# Patient Record
Sex: FEMALE | Race: WHITE
[De-identification: names, ages, dates, MRNs, and addresses within clinical notes are randomized per-mention and may not be internally consistent; named-entity substitution may affect disease eponyms.]

---

## 2020-03-11 NOTE — PCM.PREANE
Preanesthetic Assessment





- Procedure


Proposed Procedure: 





Left Total Hip Arthroplasty





- Anesthesia/Transfusion/Family Hx


Anesthesia History: Prior Anesthesia Without Reaction


Family History of Anesthesia Reaction: No


Transfusion History: No Prior Transfusion(s)





- Review of Systems


General: No Symptoms


Pulmonary: No Symptoms, Other (Asthma symptoms when around cigarette smoke and 

perfumes. )


Cardiovascular: No Symptoms (CABG in 2018, follows with cardiology, has been 

doing well since. )


Gastrointestinal: Other (Occasional heart burn if eating before bed. )


Neurological: Pre-Existing Deficit (Chronic low back pain, gets epidural 

steroid injections when needed. Pain goes to hip and knees. Her back is hurting 

while she is resting in bed this morning, pain is tolerable. ), Other (

Fibromyalgia)


Other: Reports: None (Caring for her  with cancer. )





- Physical Assessment


NPO Status Date: 03/10/20


NPO Status Time: 22:00


Vital Signs: 





 Last Vital Signs











Temp  36.4 C   03/11/20 07:27


 


Pulse  89   03/11/20 07:27


 


Resp  16   03/11/20 07:27


 


BP  146/78 H  03/11/20 08:15


 


Pulse Ox  98   03/11/20 07:27











Height: 1.57 m


Weight: 78.471 kg


ASA Class: 3


Mental Status: Alert & Oriented x3


Airway Class: Mallampati = 2


Dentition: Reports: Normal Dentition


Thyro-Mental Finger Breadths: 3


Mouth Opening Finger Breadths: 3


ROM/Head Extension: Full


Lungs: Clear to Auscultation, Normal Respiratory Effort


Cardiovascular: Regular Rate, Regular Rhythm





- Lab


Values: 





 Laboratory Last Values











MRSA (PCR)  Negative   03/04/20  14:40    














- Allergies


Allergies/Adverse Reactions: 


 Allergies











Allergy/AdvReac Type Severity Reaction Status Date / Time


 


cephalexin [From Keflex] Allergy  Difficulty Verified 03/10/20 13:10





   Breathing  


 


ciprofloxacin [From Cipro] Allergy  Rash Verified 03/10/20 13:10


 


latex Allergy  Itching Verified 03/10/20 13:10


 


tapentadol [From Nucynta] AdvReac  Hallucinati Verified 03/10/20 13:27





   ons  














- Anesthesia Plan


Pre-Op Medication Ordered: Anxiolytic, Other (Total joint preop medications)





- Acknowledgements


Anesthesia Type Planned: Spinal


Pt an Appropriate Candidate for the Planned Anesthesia: Yes


Alternatives and Risks of Anesthesia Discussed w Pt/Guardian: Yes


Pt/Guardian Understands and Agrees with Anesthesia Plan: Yes


Additional Comments: 





Discussed spinal block with patient. Awilda is a sethi and would like to try a 

spinal block. She does understand it may aggravate her back pain. She tolerated 

her epidural steroid injections well. She has no further questions or concerns 

at this time. 





PreAnesthesia Questionnaire


HEENT History: Reports: Allergic Rhinitis, Impaired Vision


Cardiovascular History: Reports: High Cholesterol, Hypertension, MI, Stents


Respiratory History: Reports: Asthma, SOB, Other (See Below)


Other Respiratory History: bronchospasm, wheezing, cough, pneumonia


Gastrointestinal History: Reports: GERD, Other (See Below)


Other Gastrointestinal History: loose stools


Genitourinary History: Reports: None


OB/GYN History: Reports: None


Musculoskeletal History: Reports: Arthritis, Back Pain, Chronic, Fibromyalgia, 

Osteoarthritis, Other (See Below)


Other Musculoskeletal History: left ankle swelling, sciatica, restless leg 

syndrome, left foot metatarsalgia


Neurological History: Reports: Headaches, Chronic, Other (See Below)


Other Neuro History: viral labyrinthitis


Psychiatric History: Reports: Anxiety, Depression, Other (See Below)


Other Psychiatric History: insomnia


Endocrine/Metabolic History: Reports: None


Hematologic History: Reports: None


Immunologic History: Reports: None


Oncologic (Cancer) History: Reports: None


Dermatologic History: Reports: Other (See Below)


Other Dermatologic History: cold sores, fat necrosis of skin





- Past Surgical History


Head Surgeries/Procedures: Reports: None


HEENT Surgical History: Reports: Tonsillectomy


Cardiovascular Surgical History: Reports: Coronary Artery Bypass


Respiratory Surgical History: Reports: None


GI Surgical History: Reports: Appendectomy, Colonoscopy


Female  Surgical History: Reports: Breast Biopsy, Tubal Ligation


Endocrine Surgical History: Reports: None


Neurological Surgical History: Reports: None


Musculoskeletal Surgical History: Reports: Other (See Below)


Other Musculoskeletal Surgeries/Procedures:: bilateral wrist surgeries, 

bilateral bunionectomies


Oncologic Surgical History: Reports: None





- SUBSTANCE USE


Smoking Status *Q: Former Smoker


Recreational Drug Use History: No





- HOME MEDS


Home Medications: 


 Home Meds





Aspirin [[place] Aspirin] 81 mg PO DAILY 03/10/20 [History]


Celecoxib 200 mg PO DAILY 03/10/20 [History]


Citalopram Hydrobromide [Celexa] 40 mg PO DAILY 03/10/20 [History]


Cyclobenzaprine [Flexeril] 10 mg PO BEDTIME PRN 03/10/20 [History]


DULoxetine [Cymbalta] 60 mg PO BEDTIME 03/10/20 [History]


Diclofenac Sodium [Voltaren 1% Gel] 1 dose TOP TID PRN 03/10/20 [History]


Fenofibrate Nanocrystallized [Fenofibrate] 48 mg PO DAILY 03/10/20 [History]


Fluticasone Propionate [Flonase] 1 dose NASBOTH DAILY PRN 03/10/20 [History]


Gabapentin [Neurontin] 100 mg PO TID 03/10/20 [History]


LORazepam [Ativan] 0.5 - 1 mg PO BEDTIME 03/10/20 [History]


Multivitamin [Daily Multiple Vitamin] 1 tab PO DAILY 03/10/20 [History]


Nitroglycerin [Nitrostat] 0.4 mg PO ASDIRECTED PRN 03/10/20 [History]


Omeprazole Magnesium [Prilosec Otc] 40 mg PO QPM 03/10/20 [History]


Rosuvastatin [Crestor] 10 mg PO MOWEFR 03/10/20 [History]


Ubidecarenone [Coq-10] 200 mg PO DAILY 03/10/20 [History]


Zolpidem [Ambien] 5 mg PO BEDTIME 03/10/20 [History]


busPIRone [Buspar] 10 mg PO DAILY 03/10/20 [History]


valACYclovir HCl [Valtrex] 1,000 mg PO ASDIRECTED PRN 03/10/20 [History]











- CURRENT (IN HOUSE) MEDS


Current Meds: 





 Current Medications





Acetaminophen (Tylenol)  975 mg PO ONETIME RIA


   Stop: 03/11/20 12:00


Aspirin (Ecotrin)  325 mg PO BID RIA


Bisacodyl (Dulcolax)  5 mg PO DAILY PRN


   PRN Reason: Constipation


Morphine Sulfate 8 mg/Epinephrine HCl 0.3 mg/Cefuroxime Sodium 750 mg/Ketorolac 

Tromethamine 30 mg/Sodium Chloride 7.9 ml  0 mg .XX ASDIRECTED PRN


   PRN Reason: Pain


Docusate Sodium (Colace)  100 mg PO BID RIA


Famotidine (Pepcid)  20 mg PO Q12H RIA


Lactated Ringer's (Ringers, Lactated)  1,000 mls @ 125 mls/hr IV ASDIRECTED RIA


   Stop: 03/11/20 23:00


Cefazolin Sodium/Dextrose 2 gm (/ Premix)  50 mls @ 100 mls/hr IV Q8H RIA


   Stop: 03/12/20 09:59


Ketorolac Tromethamine (Toradol)  15 mg IVPUSH Q6H PRN


   PRN Reason: Pain


Lidocaine/Sodium Bicarbonate (Buffered Lidocaine 1% In Ns 8.4%)  0.25 ml IDERM 

ONETIME PRN


   PRN Reason: Prior to IV Start


   Stop: 03/11/20 18:00


Magnesium Hydroxide (Milk Of Magnesia)  30 ml PO BID PRN


   PRN Reason: Constipation


Morphine Sulfate (Morphine)  2 mg IVPUSH Q2H PRN


   PRN Reason: Breakthrough Pain


Naloxone HCl (Narcan)  0.1 mg IVPUSH Q5M PRN


   PRN Reason: Oversedation


Ondansetron HCl (Zofran)  4 mg IVPUSH Q6H PRN


   PRN Reason: Nausea/Vomiting


Oxycodone HCl (Oxycontin)  10 mg PO ONETIME RIA


   Stop: 03/11/20 12:00


Oxycodone/Acetaminophen (Percocet 325-5 Mg)  1 - 2 tab PO Q4H PRN


   PRN Reason: Pain


Pregabalin (Lyrica)  50 mg PO ONETIME RIA


   Stop: 03/11/20 12:00


Senna (Senna)  8.6 mg PO BID PRN


   PRN Reason: Constipation


Sodium Chloride (Saline Flush)  10 ml FLUSH ASDIRECTED PRN


   PRN Reason: Keep Vein Open


   Stop: 03/11/20 18:00





Discontinued Medications





Acetaminophen (Tylenol)  975 mg PO ONETIME RIA


   Stop: 03/11/20 16:00


   Last Admin: 03/11/20 07:58 Dose:  975 mg


Bupivacaine HCl (Sensorcaine-Mpf 0.25%) Confirm Administered Dose 30 ml .ROUTE 

.STK-MED ONE


   Stop: 03/11/20 07:39


Cefazolin Sodium (Ancef) Confirm Administered Dose 2 gm .ROUTE .STK-MED ONE


   Stop: 03/11/20 07:19


Cefazolin Sodium (Ancef) Confirm Administered Dose 2 gm .ROUTE .STK-MED ONE


   Stop: 03/11/20 07:39


Dexamethasone (Dexamethasone) Confirm Administered Dose 20 mg .ROUTE .STK-MED 

ONE


   Stop: 03/11/20 07:20


Fentanyl (Sublimaze) Confirm Administered Dose 100 mcg .ROUTE .STK-MED ONE


   Stop: 03/11/20 07:20


Fentanyl (Sublimaze) Confirm Administered Dose 100 mcg .ROUTE .STK-MED ONE


   Stop: 03/11/20 07:21


Lidocaine HCl (Xylocaine-Mpf 1%) Confirm Administered Dose 4 mls @ as directed 

.ROUTE .STK-MED ONE


   Stop: 03/11/20 07:19


Lactated Ringer's (Ringers, Lactated) Confirm Administered Dose 1,000 mls @ as 

directed .ROUTE .STK-MED ONE


   Stop: 03/11/20 07:19


Iodine (Iodine 2% Mild Tincture) Confirm Administered Dose 30 ml .ROUTE .STK-

MED ONE


   Stop: 03/11/20 07:39


Midazolam HCl (Versed 1 Mg/Ml) Confirm Administered Dose 2 mg .ROUTE .STK-MED 

ONE


   Stop: 03/11/20 07:20


Ondansetron HCl (Zofran) Confirm Administered Dose 4 mg .ROUTE .ST-MED ONE


   Stop: 03/11/20 07:19


Oxycodone HCl (Oxycontin)  10 mg PO ONETIME [facility]


   Stop: 03/11/20 16:00


   Last Admin: 03/11/20 07:58 Dose:  10 mg


Pregabalin (Lyrica)  50 mg PO ONETIME [facility]


   Stop: 03/11/20 16:00


   Last Admin: 03/11/20 07:58 Dose:  50 mg


Propofol (Diprivan  20 Ml) Confirm Administered Dose 1,000 mg .ROUTE .STK-MED 

ONE


   Stop: 03/11/20 07:20


Tranexamic Acid (Cyklokapron) Confirm Administered Dose 1,000 mg .ROUTE .STK-

MED ONE


   Stop: 03/11/20 07:39


Vancomycin HCl (Vancomycin) Confirm Administered Dose 1 gm .ROUTE .STK-MED ONE


   Stop: 03/11/20 07:39

## 2020-03-11 NOTE — CR
Pelvis left hip: AP view of the pelvis was obtained as well as 

crosstable lateral view of the left hip.

 

Comparison: No previous pelvis or hip exam is available.

 

Left hip prosthesis is seen.  Components are aligned.  Underlying bony

 structures appear intact.

 

Mild joint space narrowing is noted within the right hip.  Diffuse 

disc space narrowing is noted within the visualized lower lumbar 

spine.

 

Impression:

1.  Recently placed left hip prosthesis.

2.  Degenerative change as noted above.

 

Diagnostic code #2

 

This report was dictated in MDT

## 2020-03-11 NOTE — PCM.CONS
H&P History of Present Illness





- General


Date of Service: 03/11/20


Admit Problem/Dx: 


 Admission Diagnosis/Problem





Admission Diagnosis/Problem      Osteoarthritis of hip








Source of Information: Patient, Old Records, Provider, RN, RN Notes Reviewed


History Limitations: Reports: No Limitations





- History of Present Illness


Initial Comments - Free Text/Narative: 


Awilda Ingram is a 68 yo female patient of Dr. David who is post-operative day 0 

of left LAURO. Hospital medicine was consulted for post-operative medical care of 

the following listed medical conditions. At this time she is resting 

comfortably in bed. Pain is controlled. She denies any chest pain, shortness of 

breath, palpitations, nausea, or vomiting. She carries a history of: CABG in 

2018, Stents placed in 2013, CAD HLD, HTN, MI, Asthma, GERD, Arthritis, Chronic 

back pain, Fibromyalgia, Osteoarthritis, Sciatica, RLS, Chronic headaches, 

Anxiety, Depression, Insomnia. She is a former smoker.





She is a full code. Her primary care provider is Dr. Garcia.





  ** Left Hip


Pain Score (Numeric/FACES): 8





- Related Data


Allergies/Adverse Reactions: 


 Allergies











Allergy/AdvReac Type Severity Reaction Status Date / Time


 


cephalexin [From Keflex] Allergy  Other Verified 03/11/20 08:24


 


ciprofloxacin [From Cipro] Allergy  Rash Verified 03/11/20 08:24


 


latex Allergy  Itching Verified 03/11/20 08:24


 


tapentadol [From Nucynta] AdvReac  Hallucinati Verified 03/11/20 08:24





   ons  











Home Medications: 


 Home Meds





Aspirin [[place] Aspirin] 81 mg PO DAILY 03/10/20 [History]


Celecoxib 200 mg PO DAILY 03/10/20 [History]


Cyclobenzaprine [Flexeril] 10 mg PO BEDTIME PRN 03/10/20 [History]


DULoxetine [Cymbalta] 60 mg PO BEDTIME 03/10/20 [History]


Diclofenac Sodium [Voltaren 1% Gel] 1 dose TOP TID PRN 03/10/20 [History]


Fluticasone Propionate [Flonase] 1 dose NASBOTH DAILY PRN 03/10/20 [History]


Gabapentin [Neurontin] 100 mg PO TID 03/10/20 [History]


LORazepam [Ativan] 0.5 - 1 mg PO BEDTIME 03/10/20 [History]


Multivitamin [Daily Multiple Vitamin] 1 tab PO DAILY 03/10/20 [History]


Nitroglycerin [Nitrostat] 0.4 mg PO ASDIRECTED PRN 03/10/20 [History]


Omeprazole Magnesium [Prilosec Otc] 40 mg PO DAILY 03/10/20 [History]


Rosuvastatin [Crestor] 10 mg PO MOWEFR 03/10/20 [History]


Ubidecarenone [Coq-10] 200 mg PO DAILY 03/10/20 [History]


Zolpidem [Ambien] 5 mg PO BEDTIME 03/10/20 [History]


busPIRone [Buspar] 10 mg PO DAILY 03/10/20 [History]


valACYclovir HCl [Valtrex] 1,000 mg PO ASDIRECTED PRN 03/10/20 [History]











Past Medical History


HEENT History: Reports: Allergic Rhinitis, Impaired Vision


Cardiovascular History: Reports: High Cholesterol, Hypertension, MI, Stents


Respiratory History: Reports: Asthma, SOB, Other (See Below)


Other Respiratory History: bronchospasm, wheezing, cough, pneumonia


Gastrointestinal History: Reports: GERD, Other (See Below)


Other Gastrointestinal History: loose stools


Genitourinary History: Reports: None


OB/GYN History: Reports: None


Musculoskeletal History: Reports: Arthritis, Back Pain, Chronic, Fibromyalgia, 

Osteoarthritis, Other (See Below)


Other Musculoskeletal History: left ankle swelling, sciatica, restless leg 

syndrome, left foot metatarsalgia


Neurological History: Reports: Headaches, Chronic, Other (See Below)


Other Neuro History: viral labyrinthitis


Psychiatric History: Reports: Anxiety, Depression, Other (See Below)


Other Psychiatric History: insomnia


Endocrine/Metabolic History: Reports: None


Hematologic History: Reports: None


Immunologic History: Reports: None


Oncologic (Cancer) History: Reports: None


Dermatologic History: Reports: Other (See Below)


Other Dermatologic History: cold sores, fat necrosis of skin





- Past Surgical History


Head Surgeries/Procedures: Reports: None


HEENT Surgical History: Reports: Tonsillectomy


Cardiovascular Surgical History: Reports: Coronary Artery Bypass


Respiratory Surgical History: Reports: None


GI Surgical History: Reports: Appendectomy, Colonoscopy


Female  Surgical History: Reports: Breast Biopsy, Tubal Ligation


Endocrine Surgical History: Reports: None


Neurological Surgical History: Reports: None


Musculoskeletal Surgical History: Reports: Other (See Below)


Other Musculoskeletal Surgeries/Procedures:: bilateral wrist surgeries, 

bilateral bunionectomies


Oncologic Surgical History: Reports: None





Social & Family History





- Tobacco Use


Smoking Status *Q: Former Smoker


Used Tobacco, but Quit: Yes


Month/Year Tobacco Last Used: 1997





- Caffeine Use


Caffeine Use: Reports: Soda





- Recreational Drug Use


Recreational Drug Use: No





H&P Review of Systems





- Review of Systems:


Review Of Systems: See Below


General: Reports: No Symptoms.  Denies: Fever, Chills


HEENT: Reports: No Symptoms.  Denies: Headaches, Sore Throat


Pulmonary: Reports: No Symptoms.  Denies: Shortness of Breath, Wheezing, 

Pleuritic Chest Pain, Cough, Sputum


Cardiovascular: Reports: No Symptoms.  Denies: Chest Pain, Palpitations, 

Dyspnea on Exertion


Gastrointestinal: Reports: No Symptoms.  Denies: Abdominal Pain, Constipation, 

Diarrhea, Nausea, Vomiting


Genitourinary: Reports: No Symptoms.  Denies: Pain


Musculoskeletal: Reports: Leg Pain


Skin: Reports: No Symptoms.  Denies: Cyanosis


Psychiatric: Reports: No Symptoms.  Denies: Confusion


Neurological: Reports: Difficulty Walking, Gait Disturbance


Hematologic/Lymphatic: Reports: No Symptoms


Immunologic: Reports: No Symptoms





Exam





- Exam


Exam: See Below





- Exam


Quality Assessment: DVT Prophylaxis.  No: Supplemental Oxygen, Urinary Catheter


General: Alert, Oriented, Cooperative.  No: Mild Distress


HEENT: Conjunctiva Clear, EACs Clear, Hearing Intact, Mucosa Moist & Pink, 

Nares Patent, Posterior Pharynx Clear, PERRLA


Neck: Supple, Trachea Midline


Lungs: Clear to Auscultation, Normal Respiratory Effort


Cardiovascular: Regular Rate, Regular Rhythm


GI/Abdominal Exam: Normal Bowel Sounds, Soft, Non-Tender, No Distention


 (Female) Exam: Deferred


Rectal (Female) Exam: Deferred


Extremities: Normal Capillary Refill, Leg Pain, Limited Range of Motion, Other (

Bandage in place on left leg. Bandage is dry and intact. Cooling pack in place. 

)


Peripheral Pulses: 2+: Radial (L), Radial (R), Dorsalis Pedis (L), Dorsalis 

Pedis (R)


Skin: Warm, Dry, Intact


Neurological: Cranial Nerves Intact (Grossly )


Neuro Extensive - Mental Status: Alert, Oriented x3, Normal Mood/Affect





Consult PN Assessment/Plan


POD#: 0


(1) S/P total hip arthroplasty


SNOMED Code(s): 145283524747, 334948892779


   Code(s): Z96.649 - PRESENCE OF UNSPECIFIED ARTIFICIAL HIP JOINT   Priority: 

High   Current Visit: Yes   


Qualifiers: 


   Laterality: left   Qualified Code(s): Z96.642 - Presence of left artificial 

hip joint   





(2) Osteoarthritis


SNOMED Code(s): 200429479


   Code(s): M19.90 - UNSPECIFIED OSTEOARTHRITIS, UNSPECIFIED SITE   Priority: 

High   Current Visit: Yes   


Qualifiers: 


   Osteoarthritis location: hip   Osteoarthritis type: primary   Laterality: 

left   Qualified Code(s): M16.12 - Unilateral primary osteoarthritis, left hip 

  





(3) HLD (hyperlipidemia)


SNOMED Code(s): 67918463


   Code(s): E78.5 - HYPERLIPIDEMIA, UNSPECIFIED   Priority: Low   Current Visit

: No   


Qualifiers: 


   Hyperlipidemia type: unspecified   Qualified Code(s): E78.5 - Hyperlipidemia

, unspecified   





(4) HTN (hypertension)


SNOMED Code(s): 65062281


   Code(s): I10 - ESSENTIAL (PRIMARY) HYPERTENSION   Priority: Medium   Current 

Visit: No   


Qualifiers: 


   Hypertension type: unspecified   Qualified Code(s): I10 - Essential (primary

) hypertension   





(5) History of MI (myocardial infarction)


SNOMED Code(s): 365044397


   Code(s): I25.2 - OLD MYOCARDIAL INFARCTION   Priority: Medium   Current Visit

: No   





(6) Hx of CABG


SNOMED Code(s): 017819666, 378362569


   Code(s): Z95.1 - PRESENCE OF AORTOCORONARY BYPASS GRAFT   Priority: Medium   

Current Visit: No   Comment: 4 vessel in 2018   





(7) Asthma


SNOMED Code(s): 433635452


   Code(s): J45.909 - UNSPECIFIED ASTHMA, UNCOMPLICATED   Priority: Medium   

Current Visit: No   


Qualifiers: 


   Asthma severity: unspecified severity   Asthma persistence: unspecified   

Asthma complication type: unspecified   Qualified Code(s): J45.909 - 

Unspecified asthma, uncomplicated   





(8) GERD (gastroesophageal reflux disease)


SNOMED Code(s): 371596746


   Code(s): K21.9 - GASTRO-ESOPHAGEAL REFLUX DISEASE WITHOUT ESOPHAGITIS   

Priority: Low   Current Visit: No   


Qualifiers: 


   Esophagitis presence: esophagitis presence not specified   Qualified Code(s)

: K21.9 - Gastro-esophageal reflux disease without esophagitis   





(9) Chronic back pain


SNOMED Code(s): 961842283


   Code(s): M54.9 - DORSALGIA, UNSPECIFIED; G89.29 - OTHER CHRONIC PAIN   

Priority: Low   Current Visit: No   


Qualifiers: 


   Back pain location: back pain in unspecified location   Back pain laterality

: unspecified   Qualified Code(s): M54.9 - Dorsalgia, unspecified; G89.29 - 

Other chronic pain   





(10) Fibromyalgia


SNOMED Code(s): 876466413


   Code(s): M79.7 - FIBROMYALGIA   Priority: Low   Current Visit: No   





(11) Sciatica


SNOMED Code(s): 58686379


   Code(s): M54.30 - SCIATICA, UNSPECIFIED SIDE   Priority: Low   Current Visit

: No   


Qualifiers: 


   Laterality: unspecified laterality   Qualified Code(s): M54.30 - Sciatica, 

unspecified side   





(12) RLS (restless legs syndrome)


SNOMED Code(s): 92142848


   Code(s): G25.81 - RESTLESS LEGS SYNDROME   Priority: Low   Current Visit: No

   





(13) Chronic headache


SNOMED Code(s): 903542416


   Code(s): R51 - HEADACHE   Priority: Low   Current Visit: No   


Qualifiers: 


   Headache type: unspecified   Intractability: not intractable   Qualified Code

(s): R51 - Headache   





(14) Anxiety


SNOMED Code(s): 54418058


   Code(s): F41.9 - ANXIETY DISORDER, UNSPECIFIED   Priority: Low   Current 

Visit: No   





(15) Depression


SNOMED Code(s): 12543773


   Code(s): F32.9 - MAJOR DEPRESSIVE DISORDER, SINGLE EPISODE, UNSPECIFIED   

Priority: Low   Current Visit: No   


Qualifiers: 


   Depression Type: other depression   Qualified Code(s): F32.89 - Other 

specified depressive episodes   





(16) Insomnia


SNOMED Code(s): 170751039


   Code(s): G47.00 - INSOMNIA, UNSPECIFIED   Priority: Low   Current Visit: No 

  


Qualifiers: 


   Insomnia type: unspecified   Qualified Code(s): G47.00 - Insomnia, 

unspecified   





(17) Former smoker


SNOMED Code(s): 0596899


   Code(s): Z87.891 - PERSONAL HISTORY OF NICOTINE DEPENDENCE   Priority: Low   

Current Visit: No   





(18) CAD (coronary artery disease)


SNOMED Code(s): 99324208


   Code(s): I25.10 - ATHSCL HEART DISEASE OF NATIVE CORONARY ARTERY W/O ANG 

PCTRS   Priority: Medium   Current Visit: No   


Qualifiers: 


   Coronary Disease-Associated Artery/Lesion type: unspecified vessel or lesion 

type   Native vs. transplanted heart: native heart   Associated angina: angina 

presence unspecified   Qualified Code(s): I25.10 - Atherosclerotic heart 

disease of native coronary artery without angina pectoris   





(19) Hx of heart artery stent


SNOMED Code(s): 864712162, 489864425


   Code(s): Z95.5 - PRESENCE OF CORONARY ANGIOPLASTY IMPLANT AND GRAFT   

Priority: Medium   Current Visit: No   Comment: 2013   


Problem List Initiated/Reviewed/Updated: Yes


Plan: 


I/P:





Acute:





S/P left total hip arthroplasty - post-operative day 0


   -DVT prophylaxis and pain management per primary care team


   -PT/OT 


   -IS/RT


   -Monitor oxygen saturation


   -Titrate oxygen as needed


   -Home medications reviewed


   -Vital signs stable 


   -Monitor labs


      -Pre-operative Hgb was 13.0


      -Pre-operative GFR was 60


   -Pre-operative echo showed LVEF of 60-65%





Osteoarthritis of left hip


   -Pain management per primary care team





Chronic:





CABG in 2018


Stents placed in 2013


CA


HLD


HTN


MI


Asthma


GERD


Arthritis


Chronic back pain


Fibromyalgia


Osteoarthritis


Sciatica


RLS


Chronic headaches


Anxiety


Depression


Insomnia





Plan:


CM for discharge planning


GI prophylaxis 


Home medications as indicated


Other orders as listed above 


Routine AM labs





She is a full code. Her PCP is Dr. Garcia





Thank you for allowing us to participate in the care of this patient!! 








Requesting Provider: Dr. David 


Date Consult Requested: 03/11/20


Patient History Reviewed: Yes


Admission H&P Reviewed: Yes


Notified Requestor: Yes

## 2020-03-11 NOTE — PCM.POSTAN
POST ANESTHESIA ASSESSMENT





- MENTAL STATUS


Mental Status: Other (Drowsy)





- VITAL SIGNS


Vital Signs: 


 Last Vital Signs











Temp  36.1 C   03/11/20 10:23


 


Pulse  76  03/11/20 10:23


 


Resp  12   03/11/20 10:23


 


BP  96/62   03/11/20 10:23


 


Pulse Ox  99   03/11/20 10:23














- RESPIRATORY


Respiratory Status: Respiratory Rate WNL, Airway Patent, O2 Saturation Stable, 

Supplemental Oxygen





- CARDIOVASCULAR


CV Status: Pulse Rate WNL, Blood Pressure Stable





- GASTROINTESTINAL


GI Status: No Symptoms





- PAIN


Pain Score: 0





- POST OP HYDRATION


Hydration Status: Adequate & Stable

## 2020-03-12 NOTE — [id number]
- General Info


Date of Service: 03/12/20


POD#: 1


Functional Status: Reports: Pain Controlled, Tolerating Diet, Ambulating, 

Urinating, Other (The pt states she is doing well and prepared for discharge to 

home with her .)





- Patient Data


Vitals - Most Recent: 


 Last Vital Signs











Temp  97.5 F   03/12/20 11:10


 


Pulse  66   03/12/20 11:10


 


Resp  12   03/12/20 11:10


 


BP  117/89   03/12/20 11:10


 


Pulse Ox  98   03/12/20 11:10











Weight - Most Recent: 180 lb


I&O - Last 24 Hours: 


 Intake & Output











 03/11/20 03/12/20 03/12/20





 22:59 06:59 14:59


 


Intake Total 1240 850 50


 


Output Total 300 1400 


 


Balance 940 -550 50











Lab Results Last 24 Hrs: 


 Laboratory Results - last 24 hr











  03/12/20 03/12/20 Range/Units





  05:15 05:15 


 


WBC  12.94 H   (3.98-10.04)  K/mm3


 


RBC  3.30 L   (3.98-5.22)  M/mm3


 


Hgb  10.3 L   (11.2-15.7)  gm/dl


 


Hct  32.2 L   (34.1-44.9)  %


 


MCV  97.6 H   (79.4-94.8)  fl


 


MCH  31.2   (25.6-32.2)  pg


 


MCHC  32.0 L   (32.2-35.5)  g/dl


 


RDW Std Deviation  42.4   (36.4-46.3)  fL


 


Plt Count  282   (182-369)  K/mm3


 


MPV  10.5   (9.4-12.3)  fl


 


Sodium   140  (136-145)  mEq/L


 


Potassium   4.7  (3.5-5.1)  mEq/L


 


Chloride   104  ()  mEq/L


 


Carbon Dioxide   28  (21-32)  mEq/L


 


Anion Gap   12.7  (5-15)  


 


BUN   16  (7-18)  mg/dL


 


Creatinine   1.1 H  (0.55-1.02)  mg/dL


 


Est Cr Clr Drug Dosing   39.25  mL/min


 


Estimated GFR (MDRD)   50  (>60)  mL/min


 


BUN/Creatinine Ratio   14.5  (14-18)  


 


Glucose   143 H  ()  mg/dL


 


Calcium   8.7  (8.5-10.1)  mg/dL


 


Total Bilirubin   0.3  (0.2-1.0)  mg/dL


 


AST   33  (15-37)  U/L


 


ALT   28  (14-59)  U/L


 


Alkaline Phosphatase   61  ()  U/L


 


Total Protein   5.8 L  (6.4-8.2)  g/dl


 


Albumin   3.2 L  (3.4-5.0)  g/dl


 


Globulin   2.6  gm/dL


 


Albumin/Globulin Ratio   1.2  (1-2)  











Med Orders - Current: 


 Current Medications





Aspirin (Ecotrin)  325 mg PO BID [facility]


   Last Admin: 03/12/20 08:12 Dose:  325 mg


Bisacodyl (Dulcolax)  5 mg PO DAILY PRN


   PRN Reason: Constipation


Buspirone HCl (Buspar)  10 mg PO DAILY [facility]


   Last Admin: 03/12/20 08:13 Dose:  10 mg


Docusate Sodium (Colace)  100 mg PO BID [facility]


   Last Admin: 03/12/20 08:13 Dose:  100 mg


Duloxetine HCl (Cymbalta)  60 mg PO BEDTIME [facility]


   Last Admin: 03/11/20 20:56 Dose:  60 mg


Gabapentin (Neurontin)  100 mg PO TID [facility]


   Last Admin: 03/12/20 08:13 Dose:  100 mg


Magnesium Hydroxide (Milk Of Magnesia)  30 ml PO BID PRN


   PRN Reason: Constipation


Morphine Sulfate (Morphine)  2 mg IVPUSH Q2H PRN


   PRN Reason: Breakthrough Pain


Multivitamins (Thera)  1 each PO DAILY [facility]


   Last Admin: 03/12/20 08:12 Dose:  1 each


Naloxone HCl (Narcan)  0.1 mg IVPUSH Q5M PRN


   PRN Reason: Oversedation


Nitroglycerin (Nitrostat)  0.4 mg SL ASDIRECTED PRN


   PRN Reason: Chest Pain


Non-Formulary Medication (Fluticasone Propionate [Flonase])  1 dose NASBOTH 

DAILY PRN


   PRN Reason: allergic rhinitis


Ondansetron HCl (Zofran)  4 mg IVPUSH Q6H PRN


   PRN Reason: Nausea/Vomiting


Oxycodone/Acetaminophen (Percocet 325-5 Mg)  1 - 2 tab PO Q4H PRN


   PRN Reason: Pain


   Last Admin: 03/12/20 06:27 Dose:  2 tab


Pantoprazole Sodium (Protonix***)  40 mg PO DAILY@0700 [facility]


   Last Admin: 03/12/20 06:27 Dose:  40 mg


Rosuvastatin Calcium (Crestor)  10 mg PO MoWeFr@0900 [facility]


   Last Admin: 03/11/20 12:48 Dose:  10 mg


Senna (Senna)  8.6 mg PO BID PRN


   PRN Reason: Constipation


Valacyclovir HCl (Valtrex)  1,000 mg PO ASDIRECTED PRN


   PRN Reason: cold sores





Discontinued Medications





Acetaminophen (Tylenol)  975 mg PO ONETIME [facility]


   Stop: 03/11/20 16:00


   Last Admin: 03/11/20 07:58 Dose:  975 mg


Acetaminophen (Tylenol)  975 mg PO ONETIME [facility]


   Stop: 03/11/20 12:00


Bupivacaine HCl (Sensorcaine-Mpf 0.25%) Confirm Administered Dose 30 ml .ROUTE 

.STK-MED ONE


   Stop: 03/11/20 07:39


   Last Admin: 03/11/20 09:55 Dose:  30 ml


Cefazolin Sodium (Ancef) Confirm Administered Dose 2 gm .ROUTE .STK-MED ONE


   Stop: 03/11/20 07:19


   Last Admin: 03/11/20 09:51 Dose:  2 gm


Cefazolin Sodium (Ancef) Confirm Administered Dose 2 gm .ROUTE .STK-MED ONE


   Stop: 03/11/20 07:39


Morphine Sulfate 8 mg/Epinephrine HCl 0.3 mg/Cefuroxime Sodium 750 mg/Ketorolac 

Tromethamine 30 mg/Sodium Chloride 7.9 ml  0 mg .XX ASDIRECTED PRN


   PRN Reason: Pain


   Last Admin: 03/11/20 09:56 Dose:  788.3 mg


Dexamethasone (Dexamethasone) Confirm Administered Dose 20 mg .ROUTE .STK-MED 

ONE


   Stop: 03/11/20 07:20


Famotidine (Pepcid)  20 mg PO Q12H [facility]


   Last Admin: 03/12/20 06:27 Dose:  Not Given


Fentanyl (Sublimaze) Confirm Administered Dose 100 mcg .ROUTE .STK-MED ONE


   Stop: 03/11/20 07:20


Fentanyl (Sublimaze) Confirm Administered Dose 100 mcg .ROUTE .STK-MED ONE


   Stop: 03/11/20 07:21


Fentanyl (Sublimaze)  50 mcg IVPUSH Q5M PRN


   PRN Reason: Pain


   Stop: 03/11/20 23:00


Hydromorphone HCl (Dilaudid)  0.5 mg IVPUSH Q10M PRN


   PRN Reason: Pain (severe 7-10)


Lactated Ringer's (Ringers, Lactated)  1,000 mls @ 125 mls/hr IV ASDIRECTED [facility]


   Stop: 03/11/20 23:00


   Last Admin: 03/11/20 07:45 Dose:  125 mls/hr


Cefazolin Sodium/Dextrose 2 gm (/ Premix)  50 mls @ 100 mls/hr IV Q8H [facility]


   Stop: 03/12/20 09:59


   Last Admin: 03/12/20 10:11 Dose:  Not Given


Lidocaine HCl (Xylocaine-Mpf 1%) Confirm Administered Dose 4 mls @ as directed 

.ROUTE .STK-MED ONE


   Stop: 03/11/20 07:19


Lactated Ringer's (Ringers, Lactated) Confirm Administered Dose 1,000 mls @ as 

directed .ROUTE .STK-MED ONE


   Stop: 03/11/20 07:19


Iodine (Iodine 2% Mild Tincture) Confirm Administered Dose 30 ml .ROUTE .STK-

MED ONE


   Stop: 03/11/20 07:39


   Last Admin: 03/11/20 09:49 Dose:  30 ml


Ketamine HCl (Ketalar) Confirm Administered Dose 500 mg .ROUTE .STK-MED ONE


   Stop: 03/11/20 09:16


Ketorolac Tromethamine (Toradol)  15 mg IVPUSH Q6H PRN


   PRN Reason: Pain


   Last Admin: 03/12/20 06:28 Dose:  15 mg


Lidocaine/Sodium Bicarbonate (Buffered Lidocaine 1% In Ns 8.4%)  0.25 ml IDERM 

ONETIME PRN


   PRN Reason: Prior to IV Start


   Stop: 03/11/20 18:00


   Last Admin: 03/11/20 07:45 Dose:  0.25 ml


Midazolam HCl (Versed 1 Mg/Ml) Confirm Administered Dose 2 mg .ROUTE .STK-MED 

ONE


   Stop: 03/11/20 07:20


Miscellaneous Medication (Phenylephrine 1 Mg/10 Ml-Ns) Confirm Administered 

Dose 1 mg IV .STK-MED ONE


   Stop: 03/11/20 12:27


Non-Formulary Medication (Diclofenac Sodium [Voltaren 1% Gel])  1 dose TOP TID 

PRN


   PRN Reason: Pain


Non-Formulary Medication (Ubidecarenone)  200 mg PO DAILY [facility]


Ondansetron HCl (Zofran) Confirm Administered Dose 4 mg .ROUTE .STK-MED ONE


   Stop: 03/11/20 07:19


Ondansetron HCl (Zofran)  4 mg IVPUSH ONETIME PRN


   PRN Reason: Nausea/Vomiting


   Stop: 03/11/20 13:00


Oxycodone HCl (Oxycontin)  10 mg PO ONETIME [facility]


   Stop: 03/11/20 16:00


   Last Admin: 03/11/20 07:58 Dose:  10 mg


Oxycodone HCl (Oxycontin)  10 mg PO ONETIME [facility]


   Stop: 03/11/20 12:00


Pregabalin (Lyrica)  50 mg PO ONETIME [facility]


   Stop: 03/11/20 16:00


   Last Admin: 03/11/20 07:58 Dose:  50 mg


Pregabalin (Lyrica)  50 mg PO ONETIME [facility]


   Stop: 03/11/20 12:00


Propofol (Diprivan  20 Ml) Confirm Administered Dose 1,000 mg .ROUTE .STK-MED 

ONE


   Stop: 03/11/20 07:20


Sodium Chloride (Saline Flush)  10 ml FLUSH ASDIRECTED PRN


   PRN Reason: Keep Vein Open


   Stop: 03/11/20 18:00


Tranexamic Acid (Cyklokapron) Confirm Administered Dose 1,000 mg .ROUTE .STK-

MED ONE


   Stop: 03/11/20 07:39


   Last Admin: 03/11/20 09:57 Dose:  1,000 mg


Vancomycin HCl (Vancomycin) Confirm Administered Dose 1 gm .ROUTE .STK-MED ONE


   Stop: 03/11/20 07:39


   Last Admin: 03/11/20 09:57 Dose:  1 gm











- Exam


Wound/Incisions: Dressing Dry and Intact


General: Alert, Cooperative, No Acute Distress


Lungs: Normal Respiratory Effort


Extremities: Other (NVS intact for BLE.  Diego's negative.  Left thigh soft.)





Sepsis Event Note





- Evaluation


Sepsis Screening Result: No Definite Risk





- Focused Exam


Vital Signs: 


 Vital Signs











  Temp Pulse Resp BP Pulse Ox


 


 03/12/20 11:10  97.5 F  66  12  117/89  98


 


 03/12/20 08:10  97.9 F  71  12  121/94 H  96


 


 03/12/20 03:08  98.2 F  68  18  95/73  100











Date Exam was Performed: 03/12/20


Time Exam was Performed: 12:28





- Problem List Review


Problem List Initiated/Reviewed/Updated: Yes





- My Orders


Last 24 Hours: 


 Active Orders 24 hr











 Category Date Time Status


 


 Ready for Discharge [RC] PER UNIT ROUTINE Care  03/12/20 07:13 Active


 


 Aspirin [Ecotrin] Med  03/12/20 09:00 Active





 325 mg PO BID   


 


 DULoxetine [Cymbalta] Med  03/11/20 21:00 Active





 60 mg PO BEDTIME   


 


 Docusate Sodium [Colace] Med  03/11/20 21:00 Active





 100 mg PO BID   


 


 Gabapentin [Neurontin] Med  03/11/20 15:00 Active





 100 mg PO TID   


 


 Multivitamins,Therapeutic [Thera] Med  03/12/20 09:00 Active





 1 each PO DAILY   


 


 Pantoprazole [ProTONIX***] Med  03/12/20 07:00 Active





 40 mg PO DAILY@0700   


 


 Rosuvastatin [Crestor] Med  03/11/20 12:00 Active





 10 mg PO MoWeFr@0900   


 


 busPIRone [Buspar] Med  03/12/20 09:00 Active





 10 mg PO DAILY   








 Medication Orders





Aspirin (Ecotrin)  325 mg PO BID [facility]


   Last Admin: 03/12/20 08:12  Dose: 325 mg


Bisacodyl (Dulcolax)  5 mg PO DAILY PRN


   PRN Reason: Constipation


Buspirone HCl (Buspar)  10 mg PO DAILY [facility]


   Last Admin: 03/12/20 08:13  Dose: 10 mg


Docusate Sodium (Colace)  100 mg PO BID [facility]


   Last Admin: 03/12/20 08:13  Dose: 100 mg


   Admin: 03/11/20 20:56  Dose: 100 mg


Duloxetine HCl (Cymbalta)  60 mg PO BEDTIME [facility]


   Last Admin: 03/11/20 20:56  Dose: 60 mg


Gabapentin (Neurontin)  100 mg PO TID [facility]


   Last Admin: 03/12/20 08:13  Dose: 100 mg


   Admin: 03/11/20 20:56  Dose: 100 mg


   Admin: 03/11/20 15:57  Dose: 100 mg


Magnesium Hydroxide (Milk Of Magnesia)  30 ml PO BID PRN


   PRN Reason: Constipation


Morphine Sulfate (Morphine)  2 mg IVPUSH Q2H PRN


   PRN Reason: Breakthrough Pain


Multivitamins (Thera)  1 each PO DAILY [facility]


   Last Admin: 03/12/20 08:12  Dose: 1 each


Naloxone HCl (Narcan)  0.1 mg IVPUSH Q5M PRN


   PRN Reason: Oversedation


Nitroglycerin (Nitrostat)  0.4 mg SL ASDIRECTED PRN


   PRN Reason: Chest Pain


Non-Formulary Medication (Fluticasone Propionate [Flonase])  1 dose NASBOTH 

DAILY PRN


   PRN Reason: allergic rhinitis


Ondansetron HCl (Zofran)  4 mg IVPUSH Q6H PRN


   PRN Reason: Nausea/Vomiting


Oxycodone/Acetaminophen (Percocet 325-5 Mg)  1 - 2 tab PO Q4H PRN


   PRN Reason: Pain


   Last Admin: 03/12/20 06:27  Dose: 2 tab


   Admin: 03/12/20 02:32  Dose: 2 tab


   Admin: 03/11/20 22:25  Dose: 2 tab


   Admin: 03/11/20 18:10  Dose: 2 tab


   Admin: 03/11/20 14:01  Dose: 2 tab


Pantoprazole Sodium (Protonix***)  40 mg PO DAILY@0700 [facility]


   Last Admin: 03/12/20 06:27  Dose: 40 mg


Rosuvastatin Calcium (Crestor)  10 mg PO MoWeFr@0900 [facility]


   Last Admin: 03/11/20 12:48  Dose: 10 mg


Senna (Senna)  8.6 mg PO BID PRN


   PRN Reason: Constipation


Valacyclovir HCl (Valtrex)  1,000 mg PO ASDIRECTED PRN


   PRN Reason: cold sores











- Assessment


Assessment           (Free Text/Narrative):: 





POD#1 - left LAURO





- Plan


Plan                        (Free Text/Narrative):: 





1.  Hgb 10.3.


2.  Discharge to home today.


3.  WBAT LLE.  LAURO precautions.


4.  Outpatient therapy.





The pt's case was discussed with Dr. David today.

## 2020-03-12 NOTE — [id number]
Post Anesthesia Note





- EVALUATION WITHIN 48HRS OF ANESTHETIC


Vital Signs in Normal Range: Yes


Patient Participated in Evaluation: Yes


Respiratory Function Stable: Yes


Airway Patent: Yes


Cardiovascular Function Stable: Yes


Hydration Status Stable: Yes


Pain Control Satisfactory: Yes


Nausea and Vomiting Control Satisfactory: Yes


Mental Status Recovered: Yes


Vital Signs: 


 Last Vital Signs











Temp  36.8 C   03/12/20 03:08


 


Pulse  68   03/12/20 03:08


 


Resp  18   03/12/20 03:08


 


BP  95/73   03/12/20 03:08


 


Pulse Ox  100   03/12/20 03:08














- COMMENTS/OBSERVATIONS


Free Text/Narrative:: 





no anesthesia complications noted

## 2020-03-12 NOTE — PCM.CONSN
- General Info


Date of Service: 03/12/20


Admission Dx/Problem (Free Text): 


 Admission Diagnosis/Problem





Admission Diagnosis/Problem      Osteoarthritis of hip








Functional Status: Reports: Pain Controlled, Tolerating Diet, Ambulating, 

Urinating, Incentive Spirometry.  Denies: New Symptoms





- Review of Systems


General: Reports: No Symptoms.  Denies: Fever, Chills


HEENT: Reports: No Symptoms.  Denies: Headaches, Sore Throat


Pulmonary: Reports: No Symptoms.  Denies: Shortness of Breath, Cough, Sputum, 

Wheezing


Cardiovascular: Reports: No Symptoms.  Denies: Chest Pain, Palpitations, 

Dyspnea on Exertion


Gastrointestinal: Reports: No Symptoms.  Denies: Abdominal Pain, Constipation, 

Diarrhea, Nausea, Vomiting


Genitourinary: Reports: No Symptoms.  Denies: Pain


Musculoskeletal: Reports: Leg Pain


Skin: Reports: No Symptoms.  Denies: Cyanosis


Neurological: Reports: Difficulty Walking, Gait Disturbance


Psychiatric: Reports: No Symptoms





- Patient Data


Vitals - Most Recent: 


 Last Vital Signs











Temp  97.5 F   03/12/20 11:10


 


Pulse  66   03/12/20 11:10


 


Resp  12   03/12/20 11:10


 


BP  117/89   03/12/20 11:10


 


Pulse Ox  98   03/12/20 11:10











Weight - Most Recent: 180 lb


I&O - Last 24 Hours: 


 Intake & Output











 03/11/20 03/12/20 03/12/20





 22:59 06:59 14:59


 


Intake Total 1240 850 50


 


Output Total 300 1400 


 


Balance 940 -550 50











Lab Results Last 24 Hours: 


 Laboratory Results - last 24 hr











  03/12/20 03/12/20 Range/Units





  05:15 05:15 


 


WBC  12.94 H   (3.98-10.04)  K/mm3


 


RBC  3.30 L   (3.98-5.22)  M/mm3


 


Hgb  10.3 L   (11.2-15.7)  gm/dl


 


Hct  32.2 L   (34.1-44.9)  %


 


MCV  97.6 H   (79.4-94.8)  fl


 


MCH  31.2   (25.6-32.2)  pg


 


MCHC  32.0 L   (32.2-35.5)  g/dl


 


RDW Std Deviation  42.4   (36.4-46.3)  fL


 


Plt Count  282   (182-369)  K/mm3


 


MPV  10.5   (9.4-12.3)  fl


 


Sodium   140  (136-145)  mEq/L


 


Potassium   4.7  (3.5-5.1)  mEq/L


 


Chloride   104  ()  mEq/L


 


Carbon Dioxide   28  (21-32)  mEq/L


 


Anion Gap   12.7  (5-15)  


 


BUN   16  (7-18)  mg/dL


 


Creatinine   1.1 H  (0.55-1.02)  mg/dL


 


Est Cr Clr Drug Dosing   39.25  mL/min


 


Estimated GFR (MDRD)   50  (>60)  mL/min


 


BUN/Creatinine Ratio   14.5  (14-18)  


 


Glucose   143 H  ()  mg/dL


 


Calcium   8.7  (8.5-10.1)  mg/dL


 


Total Bilirubin   0.3  (0.2-1.0)  mg/dL


 


AST   33  (15-37)  U/L


 


ALT   28  (14-59)  U/L


 


Alkaline Phosphatase   61  ()  U/L


 


Total Protein   5.8 L  (6.4-8.2)  g/dl


 


Albumin   3.2 L  (3.4-5.0)  g/dl


 


Globulin   2.6  gm/dL


 


Albumin/Globulin Ratio   1.2  (1-2)  











Med Orders - Current: 


 Current Medications





Aspirin (Ecotrin)  325 mg PO BID [facility]


   Last Admin: 03/12/20 08:12 Dose:  325 mg


Bisacodyl (Dulcolax)  5 mg PO DAILY PRN


   PRN Reason: Constipation


Buspirone HCl (Buspar)  10 mg PO DAILY [facility]


   Last Admin: 03/12/20 08:13 Dose:  10 mg


Docusate Sodium (Colace)  100 mg PO BID [facility]


   Last Admin: 03/12/20 08:13 Dose:  100 mg


Duloxetine HCl (Cymbalta)  60 mg PO BEDTIME [facility]


   Last Admin: 03/11/20 20:56 Dose:  60 mg


Gabapentin (Neurontin)  100 mg PO TID [facility]


   Last Admin: 03/12/20 08:13 Dose:  100 mg


Magnesium Hydroxide (Milk Of Magnesia)  30 ml PO BID PRN


   PRN Reason: Constipation


Morphine Sulfate (Morphine)  2 mg IVPUSH Q2H PRN


   PRN Reason: Breakthrough Pain


Multivitamins (Thera)  1 each PO DAILY [facility]


   Last Admin: 03/12/20 08:12 Dose:  1 each


Naloxone HCl (Narcan)  0.1 mg IVPUSH Q5M PRN


   PRN Reason: Oversedation


Nitroglycerin (Nitrostat)  0.4 mg SL ASDIRECTED PRN


   PRN Reason: Chest Pain


Non-Formulary Medication (Fluticasone Propionate [Flonase])  1 dose NASBOTH 

DAILY PRN


   PRN Reason: allergic rhinitis


Ondansetron HCl (Zofran)  4 mg IVPUSH Q6H PRN


   PRN Reason: Nausea/Vomiting


Oxycodone/Acetaminophen (Percocet 325-5 Mg)  1 - 2 tab PO Q4H PRN


   PRN Reason: Pain


   Last Admin: 03/12/20 06:27 Dose:  2 tab


Pantoprazole Sodium (Protonix***)  40 mg PO DAILY@0700 [facility]


   Last Admin: 03/12/20 06:27 Dose:  40 mg


Rosuvastatin Calcium (Crestor)  10 mg PO MoWeFr@0900 [facility]


   Last Admin: 03/11/20 12:48 Dose:  10 mg


Senna (Senna)  8.6 mg PO BID PRN


   PRN Reason: Constipation


Valacyclovir HCl (Valtrex)  1,000 mg PO ASDIRECTED PRN


   PRN Reason: cold sores





Discontinued Medications





Acetaminophen (Tylenol)  975 mg PO ONETIME [facility]


   Stop: 03/11/20 16:00


   Last Admin: 03/11/20 07:58 Dose:  975 mg


Acetaminophen (Tylenol)  975 mg PO ONETIME [facility]


   Stop: 03/11/20 12:00


Bupivacaine HCl (Sensorcaine-Mpf 0.25%) Confirm Administered Dose 30 ml .ROUTE 

.STK-MED ONE


   Stop: 03/11/20 07:39


   Last Admin: 03/11/20 09:55 Dose:  30 ml


Cefazolin Sodium (Ancef) Confirm Administered Dose 2 gm .ROUTE .STK-MED ONE


   Stop: 03/11/20 07:19


   Last Admin: 03/11/20 09:51 Dose:  2 gm


Cefazolin Sodium (Ancef) Confirm Administered Dose 2 gm .ROUTE .STK-MED ONE


   Stop: 03/11/20 07:39


Morphine Sulfate 8 mg/Epinephrine HCl 0.3 mg/Cefuroxime Sodium 750 mg/Ketorolac 

Tromethamine 30 mg/Sodium Chloride 7.9 ml  0 mg .XX ASDIRECTED PRN


   PRN Reason: Pain


   Last Admin: 03/11/20 09:56 Dose:  788.3 mg


Dexamethasone (Dexamethasone) Confirm Administered Dose 20 mg .ROUTE .STK-MED 

ONE


   Stop: 03/11/20 07:20


Famotidine (Pepcid)  20 mg PO Q12H [facility]


   Last Admin: 03/12/20 06:27 Dose:  Not Given


Fentanyl (Sublimaze) Confirm Administered Dose 100 mcg .ROUTE .STK-MED ONE


   Stop: 03/11/20 07:20


Fentanyl (Sublimaze) Confirm Administered Dose 100 mcg .ROUTE .STK-MED ONE


   Stop: 03/11/20 07:21


Fentanyl (Sublimaze)  50 mcg IVPUSH Q5M PRN


   PRN Reason: Pain


   Stop: 03/11/20 23:00


Hydromorphone HCl (Dilaudid)  0.5 mg IVPUSH Q10M PRN


   PRN Reason: Pain (severe 7-10)


Lactated Ringer's (Ringers, Lactated)  1,000 mls @ 125 mls/hr IV ASDIRECTED [facility]


   Stop: 03/11/20 23:00


   Last Admin: 03/11/20 07:45 Dose:  125 mls/hr


Cefazolin Sodium/Dextrose 2 gm (/ Premix)  50 mls @ 100 mls/hr IV Q8H [facility]


   Stop: 03/12/20 09:59


   Last Admin: 03/12/20 10:11 Dose:  Not Given


Lidocaine HCl (Xylocaine-Mpf 1%) Confirm Administered Dose 4 mls @ as directed 

.ROUTE .STK-MED ONE


   Stop: 03/11/20 07:19


Lactated Ringer's (Ringers, Lactated) Confirm Administered Dose 1,000 mls @ as 

directed .ROUTE .STK-MED ONE


   Stop: 03/11/20 07:19


Iodine (Iodine 2% Mild Tincture) Confirm Administered Dose 30 ml .ROUTE .STK-

MED ONE


   Stop: 03/11/20 07:39


   Last Admin: 03/11/20 09:49 Dose:  30 ml


Ketamine HCl (Ketalar) Confirm Administered Dose 500 mg .ROUTE .STK-MED ONE


   Stop: 03/11/20 09:16


Ketorolac Tromethamine (Toradol)  15 mg IVPUSH Q6H PRN


   PRN Reason: Pain


   Last Admin: 03/12/20 06:28 Dose:  15 mg


Lidocaine/Sodium Bicarbonate (Buffered Lidocaine 1% In Ns 8.4%)  0.25 ml IDERM 

ONETIME PRN


   PRN Reason: Prior to IV Start


   Stop: 03/11/20 18:00


   Last Admin: 03/11/20 07:45 Dose:  0.25 ml


Midazolam HCl (Versed 1 Mg/Ml) Confirm Administered Dose 2 mg .ROUTE .STK-MED 

ONE


   Stop: 03/11/20 07:20


Miscellaneous Medication (Phenylephrine 1 Mg/10 Ml-Ns) Confirm Administered 

Dose 1 mg IV .STK-MED ONE


   Stop: 03/11/20 12:27


Non-Formulary Medication (Diclofenac Sodium [Voltaren 1% Gel])  1 dose TOP TID 

PRN


   PRN Reason: Pain


Non-Formulary Medication (Ubidecarenone)  200 mg PO DAILY [facility]


Ondansetron HCl (Zofran) Confirm Administered Dose 4 mg .ROUTE .STK-MED ONE


   Stop: 03/11/20 07:19


Ondansetron HCl (Zofran)  4 mg IVPUSH ONETIME PRN


   PRN Reason: Nausea/Vomiting


   Stop: 03/11/20 13:00


Oxycodone HCl (Oxycontin)  10 mg PO ONETIME [facility]


   Stop: 03/11/20 16:00


   Last Admin: 03/11/20 07:58 Dose:  10 mg


Oxycodone HCl (Oxycontin)  10 mg PO ONETIME [facility]


   Stop: 03/11/20 12:00


Pregabalin (Lyrica)  50 mg PO ONETIME [facility]


   Stop: 03/11/20 16:00


   Last Admin: 03/11/20 07:58 Dose:  50 mg


Pregabalin (Lyrica)  50 mg PO ONETIME [facility]


   Stop: 03/11/20 12:00


Propofol (Diprivan  20 Ml) Confirm Administered Dose 1,000 mg .ROUTE .STK-MED 

ONE


   Stop: 03/11/20 07:20


Sodium Chloride (Saline Flush)  10 ml FLUSH ASDIRECTED PRN


   PRN Reason: Keep Vein Open


   Stop: 03/11/20 18:00


Tranexamic Acid (Cyklokapron) Confirm Administered Dose 1,000 mg .ROUTE .STK-

MED ONE


   Stop: 03/11/20 07:39


   Last Admin: 03/11/20 09:57 Dose:  1,000 mg


Vancomycin HCl (Vancomycin) Confirm Administered Dose 1 gm .ROUTE .STK-MED ONE


   Stop: 03/11/20 07:39


   Last Admin: 03/11/20 09:57 Dose:  1 gm











- Exam


Quality Assessment: DVT Prophylaxis.  No: Supplemental Oxygen, Urine Catheter


General: Alert, Oriented, Cooperative, No Acute Distress


HEENT: Pupils Equal, Pupils Reactive, Mucous Membr. Moist/Pink


Neck: Supple, Trachea Midline


Lungs: Clear to Auscultation, Normal Respiratory Effort


Cardiovascular: Regular Rate, Regular Rhythm


GI/Abdominal Exam: Normal Bowel Sounds, Soft, Non-Tender, No Distention


 (Female) Exam: Deferred


Back Exam: Normal Inspection, Full Range of Motion


Extremities: Normal Capillary Refill, Leg Pain, Limited Range of Motion, Other (

Bandage in place on left leg. Bandage is dry and intact. )


Peripheral Pulses: 2+: Radial (L), Radial (R), Dorsalis Pedis (L), Dorsalis 

Pedis (R)


Skin: Warm, Dry, Intact


Wound/Incisions: Dressing Dry and Intact


Neurological: No New Focal Deficit


Psy/Mental Status: Alert, Normal Affect, Normal Mood





Sepsis Event Note





- Evaluation


Sepsis Screening Result: No Definite Risk





- Focused Exam


Vital Signs: 


 Vital Signs











  Temp Pulse Resp BP Pulse Ox


 


 03/12/20 11:10  97.5 F  66  12  117/89  98


 


 03/12/20 08:10  97.9 F  71  12  121/94 H  96


 


 03/12/20 03:08  98.2 F  68  18  95/73  100











Date Exam was Performed: 03/12/20


Time Exam was Performed: 11:53





Consult PN Assessment/Plan


POD#: 1


(1) S/P total hip arthroplasty


SNOMED Code(s): 575780361499, 423648384875


   Code(s): Z96.649 - PRESENCE OF UNSPECIFIED ARTIFICIAL HIP JOINT   Priority: 

High   Current Visit: Yes   


Qualifiers: 


   Laterality: left   Qualified Code(s): Z96.642 - Presence of left artificial 

hip joint   





(2) Osteoarthritis


SNOMED Code(s): 921797167


   Code(s): M19.90 - UNSPECIFIED OSTEOARTHRITIS, UNSPECIFIED SITE   Priority: 

High   Current Visit: Yes   


Qualifiers: 


   Osteoarthritis location: hip   Osteoarthritis type: primary   Laterality: 

left   Qualified Code(s): M16.12 - Unilateral primary osteoarthritis, left hip 

  





(3) HLD (hyperlipidemia)


SNOMED Code(s): 08833001


   Code(s): E78.5 - HYPERLIPIDEMIA, UNSPECIFIED   Priority: Low   Current Visit

: No   


Qualifiers: 


   Hyperlipidemia type: unspecified   Qualified Code(s): E78.5 - Hyperlipidemia

, unspecified   





(4) HTN (hypertension)


SNOMED Code(s): 08771109


   Code(s): I10 - ESSENTIAL (PRIMARY) HYPERTENSION   Priority: Medium   Current 

Visit: No   


Qualifiers: 


   Hypertension type: unspecified   Qualified Code(s): I10 - Essential (primary

) hypertension   





(5) History of MI (myocardial infarction)


SNOMED Code(s): 382379431


   Code(s): I25.2 - OLD MYOCARDIAL INFARCTION   Priority: Medium   Current Visit

: No   





(6) Hx of CABG


SNOMED Code(s): 616450788, 719603635


   Code(s): Z95.1 - PRESENCE OF AORTOCORONARY BYPASS GRAFT   Priority: Medium   

Current Visit: No   Comment: 4 vessel in 2018   





(7) Asthma


SNOMED Code(s): 710935689


   Code(s): J45.909 - UNSPECIFIED ASTHMA, UNCOMPLICATED   Priority: Medium   

Current Visit: No   


Qualifiers: 


   Asthma severity: unspecified severity   Asthma persistence: unspecified   

Asthma complication type: unspecified   Qualified Code(s): J45.909 - 

Unspecified asthma, uncomplicated   





(8) GERD (gastroesophageal reflux disease)


SNOMED Code(s): 173240981


   Code(s): K21.9 - GASTRO-ESOPHAGEAL REFLUX DISEASE WITHOUT ESOPHAGITIS   

Priority: Low   Current Visit: No   


Qualifiers: 


   Esophagitis presence: esophagitis presence not specified   Qualified Code(s)

: K21.9 - Gastro-esophageal reflux disease without esophagitis   





(9) Chronic back pain


SNOMED Code(s): 879225632


   Code(s): M54.9 - DORSALGIA, UNSPECIFIED; G89.29 - OTHER CHRONIC PAIN   

Priority: Low   Current Visit: No   


Qualifiers: 


   Back pain location: back pain in unspecified location   Back pain laterality

: unspecified   Qualified Code(s): M54.9 - Dorsalgia, unspecified; G89.29 - 

Other chronic pain   





(10) Fibromyalgia


SNOMED Code(s): 773408965


   Code(s): M79.7 - FIBROMYALGIA   Priority: Low   Current Visit: No   





(11) Sciatica


SNOMED Code(s): 37057278


   Code(s): M54.30 - SCIATICA, UNSPECIFIED SIDE   Priority: Low   Current Visit

: No   


Qualifiers: 


   Laterality: unspecified laterality   Qualified Code(s): M54.30 - Sciatica, 

unspecified side   





(12) RLS (restless legs syndrome)


SNOMED Code(s): 29219744


   Code(s): G25.81 - RESTLESS LEGS SYNDROME   Priority: Low   Current Visit: No

   





(13) Chronic headache


SNOMED Code(s): 710490623


   Code(s): R51 - HEADACHE   Priority: Low   Current Visit: No   


Qualifiers: 


   Headache type: unspecified   Intractability: not intractable   Qualified Code

(s): R51 - Headache   





(14) Anxiety


SNOMED Code(s): 76807726


   Code(s): F41.9 - ANXIETY DISORDER, UNSPECIFIED   Priority: Low   Current 

Visit: No   





(15) Depression


SNOMED Code(s): 76292228


   Code(s): F32.9 - MAJOR DEPRESSIVE DISORDER, SINGLE EPISODE, UNSPECIFIED   

Priority: Low   Current Visit: No   


Qualifiers: 


   Depression Type: other depression   Qualified Code(s): F32.89 - Other 

specified depressive episodes   





(16) Insomnia


SNOMED Code(s): 755158667


   Code(s): G47.00 - INSOMNIA, UNSPECIFIED   Priority: Low   Current Visit: No 

  


Qualifiers: 


   Insomnia type: unspecified   Qualified Code(s): G47.00 - Insomnia, 

unspecified   





(17) Former smoker


SNOMED Code(s): 6001974


   Code(s): Z87.891 - PERSONAL HISTORY OF NICOTINE DEPENDENCE   Priority: Low   

Current Visit: No   





(18) CAD (coronary artery disease)


SNOMED Code(s): 33050849


   Code(s): I25.10 - ATHSCL HEART DISEASE OF NATIVE CORONARY ARTERY W/O ANG 

PCTRS   Priority: Medium   Current Visit: No   


Qualifiers: 


   Coronary Disease-Associated Artery/Lesion type: unspecified vessel or lesion 

type   Native vs. transplanted heart: native heart   Associated angina: angina 

presence unspecified   Qualified Code(s): I25.10 - Atherosclerotic heart 

disease of native coronary artery without angina pectoris   





(19) Hx of heart artery stent


SNOMED Code(s): 977554298, 484992078


   Code(s): Z95.5 - PRESENCE OF CORONARY ANGIOPLASTY IMPLANT AND GRAFT   

Priority: Medium   Current Visit: No   Comment: 2013   


Problem List Initiated/Reviewed/Updated: Yes


Plan: 


I/P:





Acute:





S/P left total hip arthroplasty - post-operative day 1


   -DVT prophylaxis and pain management per primary care team


   -PT/OT 


   -IS/RT


   -Monitor oxygen saturation


   -Titrate oxygen as needed


   -Home medications reviewed


   -Vital signs stable 


   -Monitor labs


      -Pre-operative Hgb was 13.0; Now 10.3


      -Pre-operative GFR was 60; Mow 50


   -Pre-operative echo showed LVEF of 60-65%





Osteoarthritis of left hip


   -Pain management per primary care team





Chronic:





CABG in 2018


Stents placed in 2013


CA


HLD


HTN


MI


Asthma


GERD


Arthritis


Chronic back pain


Fibromyalgia


Osteoarthritis


Sciatica


RLS


Chronic headaches


Anxiety


Depression


Insomnia





Plan:


CM for discharge planning


GI prophylaxis 


Home medications as indicated


Other orders as listed above 


Routine AM labs





She is a full code. Her PCP is Dr. Garcia








From a hospitalist standpoint Awilda is doing well. She has been up ambulating 

and working with therapies. She is off of oxygen and has urinated. Labs and 

vital signs remain stable, with the exception of a slightly decreased GFR. We 

discussed PO hydration and avoiding things like pop, coffee, alcohol, etc. Her 

pain is controlled and she has been utilizing her IS. She is cleared for 

discharge pending primary team and PT/OT agreement.





Thank you for allowing us to participate in the care of this patient!!

## 2020-03-13 NOTE — PCM.DCSUM1
**Discharge Summary





- Hospital Course


Brief History: Awilda is a 68 yo female who underwent right LAURO with Dr. David on 

3-.  The procedure was completed under spinal anesthesia with sedation.  

The pt tolerated the procedure well and was admitted to the [facility] 

[facility].  Medical management was provided by the Hospitalist service.  The pt's 

Hospital course was uneventful.  The pt's Hgb on POD#1 was 10.3.  On POD#1, 

325mg ASA BID was initiated for VTE prophylaxis.  SCDs and TEDs were also 

ordered.  A Mepilex dressing was placed at the incision site at the time of 

surgery and remained clean and dry.  The pt participated in P.T. and O.T. and 

progressed well.  She followed the LAURO precautions.  The pt was allowed to 

WBAT.  On POD#1, the pt was deemed appropriate to discharge to home with her 

family.





- Discharge Data


Discharge Date: 03/12/20


Discharge Disposition: Home, Self-Care 01


Condition: Good





- Referral to Home Health


Primary Care Physician: 


Faith Yeboah MD








- Patient Summary/Data


Consults: 


 Consultations





03/11/20 06:29


OT Evaluation and Treatment [CONS] Routine 


PT Evaluation and Treatment [CONS] Routine 





03/11/20 06:30


Consult to Physician [CONS] Routine 














- Patient Instructions


Diet: Usual Diet as Tolerated


Activity: Apply Ice, As Tolerated, Elevate Extremity, Full Weight Bearing


Activity, Other: Follow the total hip precautions.


Driving: Do Not Drive


Showering/Bathing: May Shower


Wound/Incision Care: Keep Operative Site/Wound Site Clean and Dry, Do NOT 

Change Dressing


Notify Provider of: Fever, Increased Pain, Swelling and Redness, Drainage, 

Nausea and/or Vomiting


Other/Special Instructions: Please get up and moving around EVERY HOUR while 

awake.  This helps to prevent blood clots.  Please use your walker and have 

help with mobility as needed.  Take a short walk in your home every hour while 

awake.  Please take 325mg Aspirin TWICE daily.  The aspirin is being used for 

blood clot prevention and not for pain management so please do not miss a dose 

of the medication.  Please do not use the 81mg aspirin daily while using the 

325mg aspirin twice daily.  When the course of 325mg aspirin is completed, you 

will resume use of the 81mg aspirin.  You could use a medication like Pepcid or 

Tagamet and a medication like Prilosec or Nexium to protect your stomach while 

you are using the aspirin.  At home, please complete the exercises that you 

learned during the Hospital stay.  Schedule for physical therapy.  Follow the 

total hip precautions.  Use the pain medication as needed.  The medication may 

cause drowsiness and constipation.  Contact your primary care provider for 

instructions if you are constipated.  You may use a stool softener like 

docusate sodium or Colace 100mg twice daily and/or a laxative like Miralax 

daily for constipation.  Increase your water and fiber intake while you are 

using the pain medication.  Discontinue use of the pain medication as soon as 

able.  Please do not use other medications that may cause drowsiness (other 

pain medications, anxiety pills, cold medications, sleeping pills, etc) while 

using the prescription pain medication.  Do not use alcohol while using the 

pain medication.  You may use acetaminophen or Tylenol for pain management, 

however, please ensure you are not using over 4000 mg or 4 grams of 

acetaminophen per day from all sources.  Your pain medication has 325mg of 

acetaminophen per tablet.  At this time, please do not use ibuprofen (Motrin, 

Advil) or naproxen (Aleve) for pain management as you are using the aspirin.  

When the aspirin course is completed in 4 to 6 weeks, you could use ibuprofen 

or naproxen for pain management (if this is allowed by your primary care 

provider).  Wear the JOSE hose during the day and you may remove these at night.

  Elevate the limb to decrease swelling.  Place ice to the area often.  Place a 

towel between your skin and the blue pad.  Use the incentive spirometer often.  

Take deep breaths throughout the day.  Please keep the dressing in place until 

follow-up.  Notify the Clinic if the dressing becomes saturated.  Increase your 

protein intake while you are healing.  If you have diabetes, please closely 

monitor your blood sugars and notify your primary care provider with abnormal 

values.  Elevated blood sugars increases the risk of infection.  Call the 

Clinic with questions or concerns - [phone].





- Discharge Plan


*PRESCRIPTION DRUG MONITORING PROGRAM REVIEWED*: No


*COPY OF PRESCRIPTION DRUG MONITORING REPORT IN PATIENT ELYSSA: No


Prescriptions/Med Rec: 


Acetaminophen/oxyCODONE [Percocet 325-5 MG] 1 - 2 tab PO Q4H PRN #50 tablet


 PRN Reason: Pain


Aspirin [Ecotrin EC] 325 mg PO BID #70 tab.ec


Home Medications: 


 Home Meds





Cyclobenzaprine [Flexeril] 10 mg PO BEDTIME PRN 03/10/20 [History]


DULoxetine [Cymbalta] 60 mg PO BEDTIME 03/10/20 [History]


Diclofenac Sodium [Voltaren 1% Gel] 1 dose TOP TID PRN 03/10/20 [History]


Fluticasone Propionate [Flonase] 1 dose NASBOTH DAILY PRN 03/10/20 [History]


Gabapentin [Neurontin] 100 mg PO TID 03/10/20 [History]


Multivitamin [Daily Multiple Vitamin] 1 tab PO DAILY 03/10/20 [History]


Nitroglycerin [Nitrostat] 0.4 mg PO ASDIRECTED PRN 03/10/20 [History]


Omeprazole Magnesium [Prilosec Otc] 40 mg PO DAILY 03/10/20 [History]


Rosuvastatin [Crestor] 10 mg PO MOWEFR 03/10/20 [History]


Ubidecarenone [Coq-10] 200 mg PO DAILY 03/10/20 [History]


busPIRone [Buspar] 10 mg PO DAILY 03/10/20 [History]


valACYclovir HCl [Valtrex] 1,000 mg PO ASDIRECTED PRN 03/10/20 [History]


Acetaminophen/oxyCODONE [Percocet 325-5 MG] 1 - 2 tab PO Q4H PRN #50 tablet 03/ 12/20 [Rx]


Aspirin [Ecotrin EC] 325 mg PO BID #70 tab.ec 03/12/20 [Rx]


Docusate Sodium [Colace] 100 mg PO BID  cap 03/12/20 [Rx]


Famotidine [Pepcid] 20 mg PO Q12H  tablet 03/12/20 [Rx]


LORazepam [Ativan] 0.5 - 1 mg PO BEDTIME #0 03/12/20 [Rx]


Magnesium Hydroxide [Milk of Magnesia] 30 ml PO BID PRN  cup 03/12/20 [Rx]


Sennosides [Senna] 8.6 mg PO BID PRN  tablet 03/12/20 [Rx]


Zolpidem [Ambien] 5 mg PO BEDTIME #0 03/12/20 [Rx]


bisacodyL [Dulcolax] 5 mg PO DAILY PRN  tablet 03/12/20 [Rx]








Patient Handouts:  Total Hip Replacement, Easy-to-Read


Referrals: 


Nataliya Bonilla PA-C [Physician Assistant] - 


(please attend the scheduled follow up appointment with Nataliya Bonilla 


3/18/2020  at  [address]


4/01/2020  at  [address]


5/06/2020  at  [address] [place] [address])





- Discharge Summary/Plan Comment


DC Time >30 min.: No





- Patient Data


Vitals - Most Recent: 


 Last Vital Signs











Temp  97.5 F   03/12/20 11:10


 


Pulse  66   03/12/20 11:10


 


Resp  12   03/12/20 11:10


 


BP  117/89   03/12/20 11:10


 


Pulse Ox  98   03/12/20 11:10











Weight - Most Recent: 180 lb


Med Orders - Current: 


 Current Medications








Discontinued Medications





Acetaminophen (Tylenol)  975 mg PO ONETIME [facility]


   Stop: 03/11/20 16:00


   Last Admin: 03/11/20 07:58 Dose:  975 mg


Acetaminophen (Tylenol)  975 mg PO ONETIME [facility]


   Stop: 03/11/20 12:00


Aspirin (Ecotrin)  325 mg PO BID [facility]


   Last Admin: 03/12/20 08:12 Dose:  325 mg


Bisacodyl (Dulcolax)  5 mg PO DAILY PRN


   PRN Reason: Constipation


Bupivacaine HCl (Sensorcaine-Mpf 0.25%) Confirm Administered Dose 30 ml .ROUTE 

.STK-MED ONE


   Stop: 03/11/20 07:39


   Last Admin: 03/11/20 09:55 Dose:  30 ml


Buspirone HCl (Buspar)  10 mg PO DAILY [facility]


   Last Admin: 03/12/20 08:13 Dose:  10 mg


Cefazolin Sodium (Ancef) Confirm Administered Dose 2 gm .ROUTE .STK-MED ONE


   Stop: 03/11/20 07:19


   Last Admin: 03/11/20 09:51 Dose:  2 gm


Cefazolin Sodium (Ancef) Confirm Administered Dose 2 gm .ROUTE .STK-MED ONE


   Stop: 03/11/20 07:39


Morphine Sulfate 8 mg/Epinephrine HCl 0.3 mg/Cefuroxime Sodium 750 mg/Ketorolac 

Tromethamine 30 mg/Sodium Chloride 7.9 ml  0 mg .XX ASDIRECTED PRN


   PRN Reason: Pain


   Last Admin: 03/11/20 09:56 Dose:  788.3 mg


Dexamethasone (Dexamethasone) Confirm Administered Dose 20 mg .ROUTE .STK-MED 

ONE


   Stop: 03/11/20 07:20


Docusate Sodium (Colace)  100 mg PO BID [facility]


   Last Admin: 03/12/20 08:13 Dose:  100 mg


Duloxetine HCl (Cymbalta)  60 mg PO BEDTIME [facility]


   Last Admin: 03/11/20 20:56 Dose:  60 mg


Famotidine (Pepcid)  20 mg PO Q12H [facility]


   Last Admin: 03/12/20 06:27 Dose:  Not Given


Fentanyl (Sublimaze) Confirm Administered Dose 100 mcg .ROUTE .STK-MED ONE


   Stop: 03/11/20 07:20


Fentanyl (Sublimaze) Confirm Administered Dose 100 mcg .ROUTE .STK-MED ONE


   Stop: 03/11/20 07:21


Fentanyl (Sublimaze)  50 mcg IVPUSH Q5M PRN


   PRN Reason: Pain


   Stop: 03/11/20 23:00


Gabapentin (Neurontin)  100 mg PO TID [facility]


   Last Admin: 03/12/20 08:13 Dose:  100 mg


Hydromorphone HCl (Dilaudid)  0.5 mg IVPUSH Q10M PRN


   PRN Reason: Pain (severe 7-10)


Lactated Ringer's (Ringers, Lactated)  1,000 mls @ 125 mls/hr IV ASDIRECTED [facility]


   Stop: 03/11/20 23:00


   Last Admin: 03/11/20 07:45 Dose:  125 mls/hr


Cefazolin Sodium/Dextrose 2 gm (/ Premix)  50 mls @ 100 mls/hr IV Q8H [facility]


   Stop: 03/12/20 09:59


   Last Admin: 03/12/20 10:11 Dose:  Not Given


Lidocaine HCl (Xylocaine-Mpf 1%) Confirm Administered Dose 4 mls @ as directed 

.ROUTE .STK-MED ONE


   Stop: 03/11/20 07:19


Lactated Ringer's (Ringers, Lactated) Confirm Administered Dose 1,000 mls @ as 

directed .ROUTE .STK-MED ONE


   Stop: 03/11/20 07:19


Iodine (Iodine 2% Mild Tincture) Confirm Administered Dose 30 ml .ROUTE .STK-

MED ONE


   Stop: 03/11/20 07:39


   Last Admin: 03/11/20 09:49 Dose:  30 ml


Ketamine HCl (Ketalar) Confirm Administered Dose 500 mg .ROUTE .STK-MED ONE


   Stop: 03/11/20 09:16


Ketorolac Tromethamine (Toradol)  15 mg IVPUSH Q6H PRN


   PRN Reason: Pain


   Last Admin: 03/12/20 06:28 Dose:  15 mg


Lidocaine/Sodium Bicarbonate (Buffered Lidocaine 1% In Ns 8.4%)  0.25 ml IDERM 

ONETIME PRN


   PRN Reason: Prior to IV Start


   Stop: 03/11/20 18:00


   Last Admin: 03/11/20 07:45 Dose:  0.25 ml


Magnesium Hydroxide (Milk Of Magnesia)  30 ml PO BID PRN


   PRN Reason: Constipation


Midazolam HCl (Versed 1 Mg/Ml) Confirm Administered Dose 2 mg .ROUTE .STK-MED 

ONE


   Stop: 03/11/20 07:20


Miscellaneous Medication (Phenylephrine 1 Mg/10 Ml-Ns) Confirm Administered 

Dose 1 mg IV .STK-MED ONE


   Stop: 03/11/20 12:27


Morphine Sulfate (Morphine)  2 mg IVPUSH Q2H PRN


   PRN Reason: Breakthrough Pain


Multivitamins (Thera)  1 each PO DAILY [facility]


   Last Admin: 03/12/20 08:12 Dose:  1 each


Naloxone HCl (Narcan)  0.1 mg IVPUSH Q5M PRN


   PRN Reason: Oversedation


Nitroglycerin (Nitrostat)  0.4 mg SL ASDIRECTED PRN


   PRN Reason: Chest Pain


Non-Formulary Medication (Diclofenac Sodium [Voltaren 1% Gel])  1 dose TOP TID 

PRN


   PRN Reason: Pain


Non-Formulary Medication (Ubidecarenone)  200 mg PO DAILY [facility]


Ondansetron HCl (Zofran)  4 mg IVPUSH Q6H PRN


   PRN Reason: Nausea/Vomiting


Ondansetron HCl (Zofran) Confirm Administered Dose 4 mg .ROUTE .STK-MED ONE


   Stop: 03/11/20 07:19


Ondansetron HCl (Zofran)  4 mg IVPUSH ONETIME PRN


   PRN Reason: Nausea/Vomiting


   Stop: 03/11/20 13:00


Oxycodone HCl (Oxycontin)  10 mg PO ONETIME [facility]


   Stop: 03/11/20 16:00


   Last Admin: 03/11/20 07:58 Dose:  10 mg


Oxycodone HCl (Oxycontin)  10 mg PO ONETIME [facility]


   Stop: 03/11/20 12:00


Oxycodone/Acetaminophen (Percocet 325-5 Mg)  1 - 2 tab PO Q4H PRN


   PRN Reason: Pain


   Last Admin: 03/12/20 13:08 Dose:  1 tab


Pantoprazole Sodium (Protonix***)  40 mg PO DAILY@0700 [facility]


   Last Admin: 03/12/20 06:27 Dose:  40 mg


Pregabalin (Lyrica)  50 mg PO ONETIME [facility]


   Stop: 03/11/20 16:00


   Last Admin: 03/11/20 07:58 Dose:  50 mg


Pregabalin (Lyrica)  50 mg PO ONETIME [facility]


   Stop: 03/11/20 12:00


Propofol (Diprivan  20 Ml) Confirm Administered Dose 1,000 mg .ROUTE .STK-MED 

ONE


   Stop: 03/11/20 07:20


Rosuvastatin Calcium (Crestor)  10 mg PO MoWeFr@0900 [facility]


   Last Admin: 03/11/20 12:48 Dose:  10 mg


Senna (Senna)  8.6 mg PO BID PRN


   PRN Reason: Constipation


Sodium Chloride (Saline Flush)  10 ml FLUSH ASDIRECTED PRN


   PRN Reason: Keep Vein Open


   Stop: 03/11/20 18:00


Tranexamic Acid (Cyklokapron) Confirm Administered Dose 1,000 mg .ROUTE .STK-

MED ONE


   Stop: 03/11/20 07:39


   Last Admin: 03/11/20 09:57 Dose:  1,000 mg


Valacyclovir HCl (Valtrex)  1,000 mg PO ASDIRECTED PRN


   PRN Reason: cold sores


Vancomycin HCl (Vancomycin) Confirm Administered Dose 1 gm .ROUTE .STK-MED ONE


   Stop: 03/11/20 07:39


   Last Admin: 03/11/20 09:57 Dose:  1 gm

## 2020-03-16 NOTE — OR
DATE OF OPERATION:  03/11/2020

 

SURGEON:  Russ David MD

 

OPERATION PERFORMED:  Left total hip arthroplasty.

 

PREOPERATIVE DIAGNOSIS:

Left hip osteoarthrosis.

 

POSTOPERATIVE DIAGNOSIS:

Left hip osteoarthrosis.

 

ANESTHESIA:

Local MAC with spinal prep.

 

ANESTHESIA PROVIDER:

Alisa Lockhart.

 

ASSISTANT:

Nataliya Bonilla PA-C, and Sharmila Lloyd LPN.

 

ESTIMATED BLOOD LOSS:

200 mL.

 

COMPLICATIONS:

None.

 

CONDITION:

Stable.

 

IMPLANTS:

1. [place] size 52 mm solid Tritanium II acetabular cup.

2. [place] size 28 -4/42 MDM components.

3. [place] size 4 Accolade II stem.

 

DESCRIPTION OF PROCEDURE:

The patient was identified in the preop holding area.  Proper site was marked

and identified by the surgeon.  The patient was taken back to the operating

theater where after adequate anesthesia, the patient was placed in a right

lateral decubitus position.  Axillary roll was placed.  Pegs were then placed

and well padded.  The patient's gluteal fold was parallel to the floor.  Left

hip was then sterilely prepped and draped in the usual sterile fashion.  OR time-

out was performed.  The patient received 2 g IV Ancef.  Standard posterior

incision was made centered over the greater trochanter.  This was taken down to

the IT band and gluteal fascia, which was incised along the incisional length.

Charnley retractor was then placed.  Short external rotators were identified,

and takedown of the short external rotators was done from the level of the

piriformis down to the lesser trochanter.  Hip was then dislocated.  Neck cut

was completed and found to be adequate.  Attention was turned to the acetabulum.

Anterior and posterior acetabular retractors were then placed and found to be

adequate visualization.  Circumferential removal of the labrum as well as

pulvinar was done at this time.  Starting with a 48 reamer, I was able to ream

up to a 52 which was found to have adequate purchase with a good bony bleeding

bed.  A 52 mm solid Tritanium II acetabular cup was impacted in place with a

roughly 45 to 50 degrees of abduction and 20 to 30 degrees of anteversion.  The

MDM liner was then impacted in place and all the parts were well seated.

Attention was turned to the femur.  A starter awl was placed down the canal.

Starting with the 0 broach, I was able to broach up to a size 4, which was found

to be rotationally and vertically stable.  The trial MDM components were then

placed starting with a 0.  It was found to have a little bit extra leg lengths

so at this time, we trialed a -4, which found to have adequate restoration of

leg lengths and stable throughout range of motion.  Bone hook was used to

dislocate the trial implants.  A size 4 Accolade II stem was then impacted in

place.  The MDM components were constructed on the back table with a 42/28, -4

head.  These were then impacted onto the stem.  The hip was then relocated.  A

#5 Ethibond suture was used for closure of short external rotators and capsule.

1 L dilute Betadine solution was irrigated through the hip along with 2-3 L

pulse lavage irrigation with Ancef.  Topical tranexamic acid and vancomycin

powder were applied and periarticular injection was completed.  A #2 barbed

suture was used for closure of the IT band and gluteal fascia, 2-0 Vicryl was

used subcutaneously, and Prineo was used for the skin.  The patient tolerated

the procedure well and sent to PACU in stable condition.

 

DD:  03/16/2020 13:16:02

DT:  03/16/2020 14:19:08  MMODAL

Job #:  [id number]

## 2020-03-16 NOTE — PCM.OPNOTE
- General Post-Op/Procedure Note


Date of Surgery/Procedure: 03/11/20


Operative Procedure(s): left total hip arthroplasty


Pre Op Diagnosis: left hip osteoarthrosis


Post-Op Diagnosis: Same


Anesthesia Technique: Local, MAC, Spinal


Primary Surgeon: Russ David


Anesthesia Provider: Ary Birch


Assistant: Nataliya Bonilla


Assistant: Sharmila Lloyd


EBLUCINA in mLs: 200


Complications: None


Condition: Good


Free Text/Narrative:: 





52 cup


4 stem 


28-4

## 2023-03-08 ENCOUNTER — HOSPITAL ENCOUNTER (OUTPATIENT)
Dept: HOSPITAL 41 - JD.SDS | Age: 73
Discharge: HOME | End: 2023-03-08
Attending: ORTHOPAEDIC SURGERY
Payer: MEDICARE

## 2023-03-08 DIAGNOSIS — M17.12: Primary | ICD-10-CM

## 2023-03-08 DIAGNOSIS — Z88.5: ICD-10-CM

## 2023-03-08 DIAGNOSIS — M79.7: ICD-10-CM

## 2023-03-08 DIAGNOSIS — I10: ICD-10-CM

## 2023-03-08 DIAGNOSIS — G47.00: ICD-10-CM

## 2023-03-08 DIAGNOSIS — K21.9: ICD-10-CM

## 2023-03-08 DIAGNOSIS — J45.20: ICD-10-CM

## 2023-03-08 DIAGNOSIS — G25.81: ICD-10-CM

## 2023-03-08 DIAGNOSIS — I25.810: ICD-10-CM

## 2023-03-08 DIAGNOSIS — G89.29: ICD-10-CM

## 2023-03-08 DIAGNOSIS — Z96.651: ICD-10-CM

## 2023-03-08 DIAGNOSIS — F41.9: ICD-10-CM

## 2023-03-08 DIAGNOSIS — E78.5: ICD-10-CM

## 2023-03-08 DIAGNOSIS — Z88.8: ICD-10-CM

## 2023-03-08 DIAGNOSIS — Z91.040: ICD-10-CM

## 2023-03-08 DIAGNOSIS — Z79.899: ICD-10-CM

## 2023-03-08 DIAGNOSIS — Z87.891: ICD-10-CM

## 2023-03-08 DIAGNOSIS — Z88.1: ICD-10-CM

## 2023-03-08 DIAGNOSIS — F32.A: ICD-10-CM

## 2023-03-08 PROCEDURE — 0055T BONE SRGRY CMPTR CT/MRI IMAG: CPT

## 2023-03-08 PROCEDURE — C1776 JOINT DEVICE (IMPLANTABLE): HCPCS

## 2023-03-08 PROCEDURE — 64447 NJX AA&/STRD FEMORAL NRV IMG: CPT

## 2023-03-08 PROCEDURE — 97161 PT EVAL LOW COMPLEX 20 MIN: CPT

## 2023-03-08 PROCEDURE — C1713 ANCHOR/SCREW BN/BN,TIS/BN: HCPCS

## 2023-03-08 PROCEDURE — 97116 GAIT TRAINING THERAPY: CPT

## 2023-03-08 PROCEDURE — 27447 TOTAL KNEE ARTHROPLASTY: CPT

## 2023-03-08 PROCEDURE — 73560 X-RAY EXAM OF KNEE 1 OR 2: CPT

## 2023-10-30 ENCOUNTER — HOSPITAL ENCOUNTER (OUTPATIENT)
Dept: HOSPITAL 41 - JD.SDS | Age: 73
Discharge: HOME | End: 2023-10-30
Attending: ORTHOPAEDIC SURGERY
Payer: MEDICARE

## 2023-10-30 DIAGNOSIS — I10: ICD-10-CM

## 2023-10-30 DIAGNOSIS — R82.90: ICD-10-CM

## 2023-10-30 DIAGNOSIS — F51.04: ICD-10-CM

## 2023-10-30 DIAGNOSIS — Z96.642: ICD-10-CM

## 2023-10-30 DIAGNOSIS — F32.A: ICD-10-CM

## 2023-10-30 DIAGNOSIS — E78.5: ICD-10-CM

## 2023-10-30 DIAGNOSIS — M79.7: ICD-10-CM

## 2023-10-30 DIAGNOSIS — F41.9: ICD-10-CM

## 2023-10-30 DIAGNOSIS — Z79.82: ICD-10-CM

## 2023-10-30 DIAGNOSIS — Z88.8: ICD-10-CM

## 2023-10-30 DIAGNOSIS — Z91.040: ICD-10-CM

## 2023-10-30 DIAGNOSIS — K21.9: ICD-10-CM

## 2023-10-30 DIAGNOSIS — Z88.5: ICD-10-CM

## 2023-10-30 DIAGNOSIS — Z88.1: ICD-10-CM

## 2023-10-30 DIAGNOSIS — Z87.891: ICD-10-CM

## 2023-10-30 DIAGNOSIS — I25.810: ICD-10-CM

## 2023-10-30 DIAGNOSIS — Z88.0: ICD-10-CM

## 2023-10-30 DIAGNOSIS — G25.81: ICD-10-CM

## 2023-10-30 DIAGNOSIS — M54.50: ICD-10-CM

## 2023-10-30 DIAGNOSIS — M16.11: Primary | ICD-10-CM

## 2023-10-30 DIAGNOSIS — J45.20: ICD-10-CM

## 2023-10-30 DIAGNOSIS — G89.29: ICD-10-CM

## 2023-10-30 DIAGNOSIS — Z79.899: ICD-10-CM

## 2023-10-30 PROCEDURE — C1776 JOINT DEVICE (IMPLANTABLE): HCPCS

## 2023-10-30 PROCEDURE — 97110 THERAPEUTIC EXERCISES: CPT

## 2023-10-30 PROCEDURE — 73501 X-RAY EXAM HIP UNI 1 VIEW: CPT

## 2023-10-30 PROCEDURE — C1713 ANCHOR/SCREW BN/BN,TIS/BN: HCPCS

## 2023-10-30 PROCEDURE — 27130 TOTAL HIP ARTHROPLASTY: CPT

## 2023-10-30 PROCEDURE — 97116 GAIT TRAINING THERAPY: CPT

## 2023-10-30 PROCEDURE — 36415 COLL VENOUS BLD VENIPUNCTURE: CPT

## 2023-10-30 PROCEDURE — 86900 BLOOD TYPING SEROLOGIC ABO: CPT

## 2023-10-30 PROCEDURE — 86850 RBC ANTIBODY SCREEN: CPT

## 2023-10-30 PROCEDURE — 97161 PT EVAL LOW COMPLEX 20 MIN: CPT

## 2023-10-30 PROCEDURE — 86901 BLOOD TYPING SEROLOGIC RH(D): CPT

## 2023-10-30 PROCEDURE — 0055T BONE SRGRY CMPTR CT/MRI IMAG: CPT

## 2023-10-30 RX ADMIN — OXYCODONE HYDROCHLORIDE SCH MG: 10 TABLET, FILM COATED, EXTENDED RELEASE ORAL at 13:20

## 2023-10-30 RX ADMIN — OXYCODONE HYDROCHLORIDE SCH MG: 10 TABLET, FILM COATED, EXTENDED RELEASE ORAL at 06:50
